# Patient Record
Sex: MALE | Race: WHITE
[De-identification: names, ages, dates, MRNs, and addresses within clinical notes are randomized per-mention and may not be internally consistent; named-entity substitution may affect disease eponyms.]

---

## 2024-10-31 ENCOUNTER — DASHBOARD ENCOUNTERS (OUTPATIENT)
Age: 64
End: 2024-10-31

## 2024-10-31 ENCOUNTER — OFFICE VISIT (OUTPATIENT)
Dept: URBAN - METROPOLITAN AREA CLINIC 12 | Facility: CLINIC | Age: 64
End: 2024-10-31

## 2024-10-31 VITALS
HEIGHT: 78 IN | DIASTOLIC BLOOD PRESSURE: 73 MMHG | SYSTOLIC BLOOD PRESSURE: 113 MMHG | HEART RATE: 87 BPM | WEIGHT: 151 LBS | TEMPERATURE: 97.7 F | BODY MASS INDEX: 17.47 KG/M2

## 2024-10-31 PROBLEM — 88111009: Status: ACTIVE | Noted: 2024-10-31

## 2024-10-31 PROBLEM — 236069009: Status: ACTIVE | Noted: 2024-10-31

## 2024-10-31 PROBLEM — 429047008: Status: ACTIVE | Noted: 2024-10-31

## 2024-10-31 NOTE — HPI-TODAY'S VISIT:
Pt is a 65 yo male presents with his wife today for changes in bowel habits. He used to have regular BM daily, however has been having frequent small, hard stools approximately 5 times a day for the past few months. He denies experiencing rectal bleeding, rectal pain or abdominal pain. Denies changes in weight or decreased appetite. His last colon was in 2019 w/ Dr. Cochran revealing 1 TA, medium sized IH, diverticulosis in the transverse and ascending colon. Repeat in 5 years advised. No FH of CRC. Reports hx of a stroke in 2015 and is hard of hearing. Not on blood thinners. Denies problems with heart, lungs or kidneys. No trouble with anesthesia in the past.

## 2024-11-01 ENCOUNTER — TELEPHONE ENCOUNTER (OUTPATIENT)
Dept: URBAN - METROPOLITAN AREA CLINIC 12 | Facility: CLINIC | Age: 64
End: 2024-11-01

## 2024-12-20 ENCOUNTER — OFFICE VISIT (OUTPATIENT)
Dept: URBAN - METROPOLITAN AREA LAB 3 | Facility: LAB | Age: 64
End: 2024-12-20
Payer: COMMERCIAL

## 2024-12-20 DIAGNOSIS — K63.89 OTHER SPECIFIED DISEASES OF INTESTINE: ICD-10-CM

## 2024-12-20 DIAGNOSIS — D12.3 ADENOMA OF TRANSVERSE COLON: ICD-10-CM

## 2024-12-20 DIAGNOSIS — K59.09 OTHER CONSTIPATION: ICD-10-CM

## 2024-12-20 DIAGNOSIS — D12.4 ADENOMA OF DESCENDING COLON: ICD-10-CM

## 2024-12-20 PROCEDURE — 45385 COLONOSCOPY W/LESION REMOVAL: CPT | Performed by: INTERNAL MEDICINE

## 2025-01-06 ENCOUNTER — OFFICE VISIT (OUTPATIENT)
Dept: URBAN - METROPOLITAN AREA CLINIC 12 | Facility: CLINIC | Age: 65
End: 2025-01-06